# Patient Record
(demographics unavailable — no encounter records)

---

## 2024-10-08 NOTE — IMAGING
[de-identified] : limited exam: aso removed, incision well healed no swelling ankle/foot no ttp along fracture able to wiggle toes rom: DF/PF 0-30, Inv/Ev 15/20 motor: 5/5 DF/PF/In/ Ev negativfe tinels proximal to incision BARB FUNK SP T S S  bcr

## 2024-10-08 NOTE — HISTORY OF PRESENT ILLNESS
[de-identified] : Date of Injury/Onset: 06/01/2024 Pain: At Rest: 0 With Activity: 2 Mechanism of injury: This is NOT a Work Related Injury being treated under Worker's Compensation. This is NOT an athletic injury occurring associated with an interscholastic or organized sports team. Quality of symptoms: sharp, swelling Improves with: Tylenol, Advil, naproxen  Worse with: standing, walking Prior treatment: splint, crutches Prior Imaging: x-rays  Reports Available For Review Today:  Out of work/sport: not working   06/12/2024 ABDOULAYE 30 year F is here today for evaluation of left ankle. Patient reports injury on June 1st, 2024, at a birthday party on her way out she stepped down, fell and put all her weight on left ankle, next morning she went to  had x-rays done, was sent some with crutches and a splint. Patient was told she had a fracture. Patient denies n/t however endorses some swelling and sharp pain. Patient states pain is localized. Patient is taking Advil, Tylenol and naproxen, also is keeping her leg elevated for pain relief.     06/18/2024 ABDOULAYE 30 year F is here today for evaluation of left ankle. Patient reports she has a tugging sensation not feeling much pain. still has the crutches but today ambulated in wheelchair. Pt has not stopped vaping. Denies calf pain. denies chest pain or sob. She picked up her pre-op meds. Here for preop visit.  07/02/2024: Patient is here today for post op visit #1. Patient takes Aleve and naproxen. Patient reports stabbing pain top, bottom of foot and heel. Patient is taking blood thinners, denies fever or chest pain after surgery. Patient is ambulating with crutches, had been compliant with nwb. compliant with dvt ppx.   07/23/2024 ABDOULAYE 31 year F is here for post op visit #2. Patient reports some mild improvement since last visit, reports some swelling and tingling of toes. Patient had been taken Tylenol and vitamins D. Patient had been compliant with cast care. Denies chest pain or breadth  08/07/2024 :ABDOULAYE MOORE , a 31 year old female, presents today for Post op visit #3. Left ankle, continues use of camboot and crutches. Did not complete crutch ween. Pt still smoking, trying to cut down. Denies chest pain or sob. Denies f/c/ns.   08/27/2024: patient is here for post op #4 for Left distal fibular open reduction and internal fixation. states that she is feeling well 50% improvement since last visit. tolerating physical therapy with no limitation, not taking anything for discomfort. just ice.  ready to start trying her regular shoe. Compliant with PT. Denies trauma injury accident. Denies chest pain or sob.   09/24/2024: Patient is here today for a follow up, s/p Left distal fibular open reduction and internal fixation. She reports improvement since her last visit, and continues to attend physical therapy 2x a week. WB in regular shoes with brace.  10/08/2024 ABDOULAYE  is here today to follow up on left ankle. Patient is doing PT and HEP. She had been taken vitamins D. Reports less pain. She had been wearing ASO. She is looking to return to work full duty.

## 2024-10-08 NOTE — DISCUSSION/SUMMARY
[de-identified] :  Patient seen and examined. Patient presents today for evaluation of left ankle pain. Based on history, physical examination, imaging I discussed with her that her symptoms are related to her bimalleolar equivalent ankle fracture. We discussed that due to the fact that she has an unstable ankle fracture surgical fixation is necessary to help anatomically reduce her ankle, additionally it will provide the fastest time to union and return to activities, which patient is interested in, and mitigate her risk for progressive arthrosis well improving her chance at an optimal recovery. We discussed the risks and benefits of surgery which include but not limited to soft tissue/wound complications, bleeding, infection, neurologic injury, malunion nonunion, potential loss of life, potential need for reoperation potential need for subsequent procedures in the future. We discussed that surgical fixation would be a combination of plates and screws to address all fractured pathology. We discussed surgical timing. Due to the fact that she is grossly swollen in her left lower extremity it is currently not safe to proceed with surgery at this time. Plan will be for surgery next week. Preoperative informative folder will be given to patient which includes preop instructions regarding weightbearing status and instructions on keeping splint clean and dry as well as how to navigate the weightbearing status. Medical clearance forms given to patient for her to be evaluated by her primary care doctor for medical clearance. Plan to see patient back in 1 week to check on the swelling of her leg and foot. Should her swelling significantly improved plan to proceed with surgery that week. I discussed with patient and her significant other that is not uncommon to require a week or 2 of soft tissue rest to allow for the skin to be amenable for surgery. They understand that we are cautiously optimistic to be able to perform his surgery next week but agree that we should only proceed when it is safe for her to do so. We went over the postoperative protocol which would include nonweightbearing for at minimum 4 weeks. She was the walker or crutch ambulating in a splint for 2 weeks followed by a boot. Sutures would plan to be removed somewhere between 2 to 4 weeks depending on how she is healing. Modifiable risk factors that she can control is increasing her vitamin D as well as having a well-balanced diet, and abstaining from any nicotine or alcohol products. She confirms that she does not use those anywhere. All questions asked and answered. Plan for patient to follow-up in 1 week for wound check.   I discussed with patient that I will determine what her postoperative cast/boot situation will be dependent on if a deltoid ligament repair is performed. I discussed with them that if it is I will keep her in a cast for another 2 to 4 weeks depending on the healing. I discussed with them that I will plan to see her back 1 more time before surgery to go over risk and benefits 1 more time as well as to assess for swelling. All questions were asked and answered. Patient is agreement with the plan follow-up accordingly.  Modifiable risk factors:  Vaping ++ Illicit substances: neg  Nonmodifiable risk factors: DM/hypothyroidism/Osteopenia: neg   next visit: order preop meds, order boot,. **** Preop visit plan for sx 6/19 Patient seen and examined. Patient presents today for evaluation of left ankle pain. Based on history, physical examination, imaging I discussed with her that her symptoms are related to her bimalleolar equivalent ankle fracture. We discussed that due to the fact that she has an unstable ankle fracture surgical fixation is necessary to help anatomically reduce her ankle, additionally it will provide the fastest time to union and return to activities, which patient is interested in, and mitigate her risk for progressive arthrosis well improving her chance at an optimal recovery. We discussed the risks and benefits of surgery which include but not limited to soft tissue/wound complications, bleeding, infection, neurologic injury, malunion nonunion, potential loss of life, potential need for reoperation potential need for subsequent procedures in the future. We discussed that surgical fixation would be a combination of plates and screws to address all fractured pathology.   We went over the postoperative protocol which would include nonweightbearing for at minimum 4 weeks. She was the walker or crutch ambulating in a splint for 2 weeks followed by a boot. Sutures would plan to be removed somewhere between 2 to 4 weeks depending on how she is healing. Modifiable risk factors that she can control is increasing her vitamin D as well as having a well-balanced diet, and abstaining from any nicotine or alcohol products. She confirms that she does not use those anywhere. All questions asked and answered. Plan for patient to follow-up in 1 week for wound check.   I discussed with patient that I will determine what her postoperative cast/boot situation will be dependent on if a deltoid ligament repair is performed. I discussed with them that if it is I will keep her in a cast for another 2 to 4 weeks depending on the healing. I discussed with them that I will plan to see her back 1 more time before surgery to go over risk and benefits 1 more time as well as to assess for swelling. All questions were asked and answered. Patient is agreement with the plan follow-up accordingly.  Modifiable risk factors:  Vaping ++ Illicit substances: neg  Nonmodifiable risk factors: DM/hypothyroidism/Osteopenia: neg   next visit: suture removal, cast application, NWB L Ankle XR ***** L distal fibula ORIF POV 1 XR demonstrate adequate reduction no evidence of loosening or backing out short leg cast applied RTC 2 weeks continue DVT ppx nwb LLE cast care   next visit: dc cast transition to boot dme rx for boot provided **** L distal fibula ORIF POV 2 short leg cast removed camboot applied crutch ween RTC 2 weeks continue DVT ppx asa 81mg qd wb progression gentle ROM  next visit: boot ween, aso, PT rx  **** 8/7/24:  L distal fibula ORIF POV 3 crutch  ween  RTC 1 week  counseled on quitting smoking   next visit: Assess that shes walking appropriately wb progression, gentle ROM  *** 8/27/24:  L distal fibula ORIF POV 4 boot wean RTC 4 week  counseled on quitting smoking   next visit: Assess that shes walking appropriately wb progression, gentle ROM  **** 9/24 L distal fibula ORIF POV 5 Continue PT continue HEP Pt endorsing some paresthesia proximal to incision rec: Scar massage vitamin C , B12  RTC 6 week  counseled on quitting smoking  Discuss RTW 10/30  next visit: discuss rt jogging and increasing impact exercises *** 10/8 L distal fibula ORIF POV 6 Continue PT continue HEP Pt no longer endorsing paresthesia proximal to incision rec: Scar massage vitamin C , B12  RTC 4 week  counseled on quitting smoking  Pt cleared to RTW  next visit: discuss rt jogging and increasing impact exercises

## 2024-11-26 NOTE — IMAGING
[de-identified] : limited exam: aso removed, incision well healed no swelling ankle/foot no ttp along fracture able to wiggle toes rom: DF/PF 0-30, Inv/Ev 15/20 motor: 5/5 DF/PF/In/ Ev negativfe tinels proximal to incision BARB FUNK SP T S S  bcr

## 2024-11-26 NOTE — HISTORY OF PRESENT ILLNESS
[de-identified] : Date of Injury/Onset: 06/01/2024 Pain: At Rest: 0 With Activity: 2 Mechanism of injury: This is NOT a Work Related Injury being treated under Worker's Compensation. This is NOT an athletic injury occurring associated with an interscholastic or organized sports team. Quality of symptoms: sharp, swelling Improves with: Tylenol, Advil, naproxen  Worse with: standing, walking Prior treatment: splint, crutches Prior Imaging: x-rays  Reports Available For Review Today:  Out of work/sport: not working   06/12/2024 ABDOULAYE 30 year F is here today for evaluation of left ankle. Patient reports injury on June 1st, 2024, at a birthday party on her way out she stepped down, fell and put all her weight on left ankle, next morning she went to  had x-rays done, was sent some with crutches and a splint. Patient was told she had a fracture. Patient denies n/t however endorses some swelling and sharp pain. Patient states pain is localized. Patient is taking Advil, Tylenol and naproxen, also is keeping her leg elevated for pain relief.     06/18/2024 ABDOULAYE 30 year F is here today for evaluation of left ankle. Patient reports she has a tugging sensation not feeling much pain. still has the crutches but today ambulated in wheelchair. Pt has not stopped vaping. Denies calf pain. denies chest pain or sob. She picked up her pre-op meds. Here for preop visit.  07/02/2024: Patient is here today for post op visit #1. Patient takes Aleve and naproxen. Patient reports stabbing pain top, bottom of foot and heel. Patient is taking blood thinners, denies fever or chest pain after surgery. Patient is ambulating with crutches, had been compliant with nwb. compliant with dvt ppx.   07/23/2024 ABDOULAYE 31 year F is here for post op visit #2. Patient reports some mild improvement since last visit, reports some swelling and tingling of toes. Patient had been taken Tylenol and vitamins D. Patient had been compliant with cast care. Denies chest pain or breadth  08/07/2024 :ABDOULAYE MOORE , a 31 year old female, presents today for Post op visit #3. Left ankle, continues use of camboot and crutches. Did not complete crutch ween. Pt still smoking, trying to cut down. Denies chest pain or sob. Denies f/c/ns.   08/27/2024: patient is here for post op #4 for Left distal fibular open reduction and internal fixation. states that she is feeling well 50% improvement since last visit. tolerating physical therapy with no limitation, not taking anything for discomfort. just ice.  ready to start trying her regular shoe. Compliant with PT. Denies trauma injury accident. Denies chest pain or sob.   09/24/2024: Patient is here today for a follow up, s/p Left distal fibular open reduction and internal fixation. She reports improvement since her last visit, and continues to attend physical therapy 2x a week. WB in regular shoes with brace.  10/08/2024 ABDOULAYE  is here today to follow up on left ankle. Patient is doing PT and HEP. She had been taken vitamins D. Reports less pain. She had been wearing ASO. She is looking to return to work full duty.   11/26/2024: Patient is here today to follow up on left ankle pain. Patient had been compliant with ASO, doing PT & HEP with relief. She had been taken vitamin D.. returned back to work 10/21/2024 and tolerating well.

## 2024-11-26 NOTE — DISCUSSION/SUMMARY
[de-identified] :  Patient seen and examined. Patient presents today for evaluation of left ankle pain. Based on history, physical examination, imaging I discussed with her that her symptoms are related to her bimalleolar equivalent ankle fracture. We discussed that due to the fact that she has an unstable ankle fracture surgical fixation is necessary to help anatomically reduce her ankle, additionally it will provide the fastest time to union and return to activities, which patient is interested in, and mitigate her risk for progressive arthrosis well improving her chance at an optimal recovery. We discussed the risks and benefits of surgery which include but not limited to soft tissue/wound complications, bleeding, infection, neurologic injury, malunion nonunion, potential loss of life, potential need for reoperation potential need for subsequent procedures in the future. We discussed that surgical fixation would be a combination of plates and screws to address all fractured pathology. We discussed surgical timing. Due to the fact that she is grossly swollen in her left lower extremity it is currently not safe to proceed with surgery at this time. Plan will be for surgery next week. Preoperative informative folder will be given to patient which includes preop instructions regarding weightbearing status and instructions on keeping splint clean and dry as well as how to navigate the weightbearing status. Medical clearance forms given to patient for her to be evaluated by her primary care doctor for medical clearance. Plan to see patient back in 1 week to check on the swelling of her leg and foot. Should her swelling significantly improved plan to proceed with surgery that week. I discussed with patient and her significant other that is not uncommon to require a week or 2 of soft tissue rest to allow for the skin to be amenable for surgery. They understand that we are cautiously optimistic to be able to perform his surgery next week but agree that we should only proceed when it is safe for her to do so. We went over the postoperative protocol which would include nonweightbearing for at minimum 4 weeks. She was the walker or crutch ambulating in a splint for 2 weeks followed by a boot. Sutures would plan to be removed somewhere between 2 to 4 weeks depending on how she is healing. Modifiable risk factors that she can control is increasing her vitamin D as well as having a well-balanced diet, and abstaining from any nicotine or alcohol products. She confirms that she does not use those anywhere. All questions asked and answered. Plan for patient to follow-up in 1 week for wound check.   I discussed with patient that I will determine what her postoperative cast/boot situation will be dependent on if a deltoid ligament repair is performed. I discussed with them that if it is I will keep her in a cast for another 2 to 4 weeks depending on the healing. I discussed with them that I will plan to see her back 1 more time before surgery to go over risk and benefits 1 more time as well as to assess for swelling. All questions were asked and answered. Patient is agreement with the plan follow-up accordingly.  Modifiable risk factors:  Vaping ++ Illicit substances: neg  Nonmodifiable risk factors: DM/hypothyroidism/Osteopenia: neg   next visit: order preop meds, order boot,. **** Preop visit plan for sx 6/19 Patient seen and examined. Patient presents today for evaluation of left ankle pain. Based on history, physical examination, imaging I discussed with her that her symptoms are related to her bimalleolar equivalent ankle fracture. We discussed that due to the fact that she has an unstable ankle fracture surgical fixation is necessary to help anatomically reduce her ankle, additionally it will provide the fastest time to union and return to activities, which patient is interested in, and mitigate her risk for progressive arthrosis well improving her chance at an optimal recovery. We discussed the risks and benefits of surgery which include but not limited to soft tissue/wound complications, bleeding, infection, neurologic injury, malunion nonunion, potential loss of life, potential need for reoperation potential need for subsequent procedures in the future. We discussed that surgical fixation would be a combination of plates and screws to address all fractured pathology.   We went over the postoperative protocol which would include nonweightbearing for at minimum 4 weeks. She was the walker or crutch ambulating in a splint for 2 weeks followed by a boot. Sutures would plan to be removed somewhere between 2 to 4 weeks depending on how she is healing. Modifiable risk factors that she can control is increasing her vitamin D as well as having a well-balanced diet, and abstaining from any nicotine or alcohol products. She confirms that she does not use those anywhere. All questions asked and answered. Plan for patient to follow-up in 1 week for wound check.   I discussed with patient that I will determine what her postoperative cast/boot situation will be dependent on if a deltoid ligament repair is performed. I discussed with them that if it is I will keep her in a cast for another 2 to 4 weeks depending on the healing. I discussed with them that I will plan to see her back 1 more time before surgery to go over risk and benefits 1 more time as well as to assess for swelling. All questions were asked and answered. Patient is agreement with the plan follow-up accordingly.  Modifiable risk factors:  Vaping ++ Illicit substances: neg  Nonmodifiable risk factors: DM/hypothyroidism/Osteopenia: neg   next visit: suture removal, cast application, NWB L Ankle XR ***** L distal fibula ORIF POV 1 XR demonstrate adequate reduction no evidence of loosening or backing out short leg cast applied RTC 2 weeks continue DVT ppx nwb LLE cast care   next visit: dc cast transition to boot dme rx for boot provided **** L distal fibula ORIF POV 2 short leg cast removed camboot applied crutch ween RTC 2 weeks continue DVT ppx asa 81mg qd wb progression gentle ROM  next visit: boot ween, aso, PT rx  **** 8/7/24:  L distal fibula ORIF POV 3 crutch  ween  RTC 1 week  counseled on quitting smoking   next visit: Assess that shes walking appropriately wb progression, gentle ROM  *** 8/27/24:  L distal fibula ORIF POV 4 boot wean RTC 4 week  counseled on quitting smoking   next visit: Assess that shes walking appropriately wb progression, gentle ROM  **** 9/24 L distal fibula ORIF POV 5 Continue PT continue HEP Pt endorsing some paresthesia proximal to incision rec: Scar massage vitamin C , B12  RTC 6 week  counseled on quitting smoking  Discuss RTW 10/30  next visit: discuss rt jogging and increasing impact exercises *** 10/8 L distal fibula ORIF POV 6 Continue PT continue HEP Pt no longer endorsing paresthesia proximal to incision rec: Scar massage vitamin C , B12  RTC 4 week  counseled on quitting smoking  Pt cleared to RTW  next visit: discuss rt jogging and increasing impact exercises **** 11/25 L distal fibula ORIF POV 7 Continue PT, phase 4 exercises and return sport continue HEP RTC 3 months  counseled on quitting smoking  Pt cleared to RTW  next visit: assess RTS no xr needed until 1 yr

## 2024-11-26 NOTE — DATA REVIEWED
[FreeTextEntry1] : 3 v L ankle OSH displaced roper b distal fibula fx with increased mcs > 4mm  3 v L ankle 6/12 displaced roper b distal fibula fx with assymetric joint space and comminution at chaput tubercle  3 v L ankle 7/1 hardware intact, adequate reduction of ankle fracture, no evidence of loosening or backing out  3 v L ankle 8/7 hardware intact, adequate reduction of ankle fracture, no evidence of loosening or backing out  3 v L ankle 8/27 hardware intact, adequate reduction of ankle fracture, no evidence of loosening or backing out, interval healing  3 v L ankle 11/25 hardware intact, no evidence of loosening or backing out, healed fx